# Patient Record
Sex: FEMALE | Race: BLACK OR AFRICAN AMERICAN | URBAN - METROPOLITAN AREA
[De-identification: names, ages, dates, MRNs, and addresses within clinical notes are randomized per-mention and may not be internally consistent; named-entity substitution may affect disease eponyms.]

---

## 2021-07-09 ENCOUNTER — HISTORICAL (OUTPATIENT)
Dept: ENDOSCOPY | Facility: HOSPITAL | Age: 66
End: 2021-07-09

## 2022-04-30 NOTE — OP NOTE
Patient:   Juli Bianchi            MRN: [id number]            FIN: [id number]               Age:   66 years     Sex:  Female     :  1955   Associated Diagnoses:   None   Author:   Elie Fatima MD      Operative Note   Operative Information   Date/ Time:  2021 08:49:00.     Procedures Performed: Colonoscopy, screening.     Preoperative Diagnosis: Screening for colon cancer.     Postoperative Diagnosis: 1.  Colonic diverticula, left and transverse colon.  2.  Internal hemorrhoids, grade 2.  Otherwise normal exam.     Surgeon: Elie Fatima MD.     Assistant: GI staff.     Anesthesia: per anaesthesia service.     Speciman Removed: None.     Esimated blood loss: No blood loss.     Description of Procedure/Findings/    Complications: History and physical as per pre-operative note. Informed consent was obtained. Patient was placed in left lateral position. Sedation per anaesthesia service. Rectal exam was unremarkable. Olympus video colonoscope was introduced into the rectum and was carefully advanced to the cecum. The quality of the preparation was satisfactory. Patient tolerated the procedure well without any complications..     Findings: There were multiple diverticula noted in left colon.  Scattered diverticula noted in transverse colon.  Remainder of the colon including cecum, ascending colon and rectum appeared unremarkable except grade 2 internal hemorrhoids noted on retroflexion in the rectum.  Terminal ileum was intubated during procedure and appeared to be unremarkable.  Estimated withdrawal time from cecum to rectum was 10 minutes and 59 seconds..     Complications: None.     Recommendations:  1.  High-fiber diet.    2.  Repeat colonoscopy in 10 years unless patient becomes symptomatic..

## 2022-04-30 NOTE — OP NOTE
Patient:   Juli Bianchi            MRN: [id number]            FIN: [id number]               Age:   66 years     Sex:  Female     :  1955   Associated Diagnoses:   None   Author:   Elie Fatima MD      Operative Note   Operative Information   Date/ Time:  2021 08:46:00.     Procedures Performed: EGD with biopsy.     Preoperative Diagnosis: H/O GERD.     Postoperative Diagnosis: 1.  Hiatal hernia, 3 or more centimeter with wide open Schatzki's ring  2.  Antral gastritis, mild to moderate.  Biopsies were obtained.  Otherwise normal exam.     Surgeon: Elie Fatima MD.     Assistant: GI staff.     Anesthesia: per anaesthesia service.     Speciman Removed: Yes.     Esimated blood loss: loss  3  cc.     Description of Procedure/Findings/    Complications: History and physical as per pre-operative note. Informed consent was obtained. Patient was placed in left lateral position. Sedation per anaesthesia service. Olympus video gastroscope was introduced into the oral cavity and esophagus was intubated under direct visualization. The scope was carefully advanced to the distal duodenum. The patient tolerated the procedure well without any complications. .     Findings: Esophagus: GE junction at approximately 33 cm.  There was a 3 cm or more hiatal hernia.  There was a wide open Schatzki's ring which was not dilated during the procedure.  Esophageal mucosa appeared unremarkable.  Stomach: Fundus, cardia and body appeared unremarkable.  There was mild to moderate erythema noted in antrum.  Antral biopsies were obtained.  No evidence of ulceration.  Duodenum: Duodenal bulb, 2nd and 3rd portion of the duodenum appeared unremarkable to the extent of exam..     Complications: None.     Recommendations:  1.  Follow biopsy results.  2.  Antireflux measures.  H2 blockers or PPI as needed.  3.  Colonoscopy to follow.

## 2025-03-06 DIAGNOSIS — M25.562 LEFT KNEE PAIN: ICD-10-CM

## 2025-03-06 DIAGNOSIS — S83.207A TEAR OF LEFT MENISCUS AS CURRENT INJURY, INITIAL ENCOUNTER: ICD-10-CM

## 2025-03-06 DIAGNOSIS — M25.762 OSTEOPHYTE OF LEFT KNEE: Primary | ICD-10-CM

## 2025-03-06 DIAGNOSIS — M17.12 OSTEOARTHRITIS OF LEFT KNEE: ICD-10-CM

## 2025-03-25 ENCOUNTER — HOSPITAL ENCOUNTER (OUTPATIENT)
Dept: RADIOLOGY | Facility: CLINIC | Age: 70
Discharge: HOME | End: 2025-03-25
Attending: ORTHOPAEDIC SURGERY
Payer: MEDICARE

## 2025-03-25 ENCOUNTER — OFFICE VISIT (OUTPATIENT)
Dept: ORTHOPEDICS | Facility: CLINIC | Age: 70
End: 2025-03-25
Payer: MEDICARE

## 2025-03-25 VITALS
HEIGHT: 64 IN | WEIGHT: 186 LBS | SYSTOLIC BLOOD PRESSURE: 115 MMHG | BODY MASS INDEX: 31.76 KG/M2 | DIASTOLIC BLOOD PRESSURE: 78 MMHG | HEART RATE: 78 BPM

## 2025-03-25 DIAGNOSIS — M17.12 PRIMARY OSTEOARTHRITIS OF LEFT KNEE: Primary | ICD-10-CM

## 2025-03-25 DIAGNOSIS — M23.307 DEGENERATIVE TEAR OF MENISCUS, LEFT: ICD-10-CM

## 2025-03-25 DIAGNOSIS — M17.12 PRIMARY OSTEOARTHRITIS OF LEFT KNEE: ICD-10-CM

## 2025-03-25 PROCEDURE — 1159F MED LIST DOCD IN RCRD: CPT | Mod: CPTII,,, | Performed by: ORTHOPAEDIC SURGERY

## 2025-03-25 PROCEDURE — 1100F PTFALLS ASSESS-DOCD GE2>/YR: CPT | Mod: CPTII,,, | Performed by: ORTHOPAEDIC SURGERY

## 2025-03-25 PROCEDURE — 1125F AMNT PAIN NOTED PAIN PRSNT: CPT | Mod: CPTII,,, | Performed by: ORTHOPAEDIC SURGERY

## 2025-03-25 PROCEDURE — 3078F DIAST BP <80 MM HG: CPT | Mod: CPTII,,, | Performed by: ORTHOPAEDIC SURGERY

## 2025-03-25 PROCEDURE — 1160F RVW MEDS BY RX/DR IN RCRD: CPT | Mod: CPTII,,, | Performed by: ORTHOPAEDIC SURGERY

## 2025-03-25 PROCEDURE — 20610 DRAIN/INJ JOINT/BURSA W/O US: CPT | Mod: LT,,, | Performed by: ORTHOPAEDIC SURGERY

## 2025-03-25 PROCEDURE — 3288F FALL RISK ASSESSMENT DOCD: CPT | Mod: CPTII,,, | Performed by: ORTHOPAEDIC SURGERY

## 2025-03-25 PROCEDURE — 73564 X-RAY EXAM KNEE 4 OR MORE: CPT | Mod: LT,,, | Performed by: ORTHOPAEDIC SURGERY

## 2025-03-25 PROCEDURE — 99204 OFFICE O/P NEW MOD 45 MIN: CPT | Mod: 25,,, | Performed by: ORTHOPAEDIC SURGERY

## 2025-03-25 PROCEDURE — 3008F BODY MASS INDEX DOCD: CPT | Mod: CPTII,,, | Performed by: ORTHOPAEDIC SURGERY

## 2025-03-25 PROCEDURE — 3074F SYST BP LT 130 MM HG: CPT | Mod: CPTII,,, | Performed by: ORTHOPAEDIC SURGERY

## 2025-03-25 RX ORDER — BETAMETHASONE SODIUM PHOSPHATE AND BETAMETHASONE ACETATE 3; 3 MG/ML; MG/ML
6 INJECTION, SUSPENSION INTRA-ARTICULAR; INTRALESIONAL; INTRAMUSCULAR; SOFT TISSUE
Status: DISCONTINUED | OUTPATIENT
Start: 2025-03-25 | End: 2025-03-25 | Stop reason: HOSPADM

## 2025-03-25 RX ORDER — MELOXICAM 7.5 MG/1
1 TABLET ORAL DAILY
COMMUNITY

## 2025-03-25 RX ORDER — LIDOCAINE HYDROCHLORIDE 20 MG/ML
2 INJECTION, SOLUTION INFILTRATION; PERINEURAL
Status: DISCONTINUED | OUTPATIENT
Start: 2025-03-25 | End: 2025-03-25 | Stop reason: HOSPADM

## 2025-03-25 RX ORDER — LEVOCETIRIZINE DIHYDROCHLORIDE 5 MG/1
1 TABLET, FILM COATED ORAL EVERY EVENING
COMMUNITY

## 2025-03-25 RX ORDER — MULTIVIT-MIN/FA/LYCOPEN/LUTEIN .4-300-25
TABLET ORAL
COMMUNITY

## 2025-03-25 RX ORDER — LOSARTAN POTASSIUM AND HYDROCHLOROTHIAZIDE 25; 100 MG/1; MG/1
1 TABLET ORAL DAILY
COMMUNITY

## 2025-03-25 RX ORDER — EZETIMIBE 10 MG/1
1 TABLET ORAL DAILY
COMMUNITY

## 2025-03-25 RX ORDER — ASPIRIN 81 MG/1
81 TABLET ORAL DAILY
COMMUNITY

## 2025-03-25 RX ORDER — TIRZEPATIDE 5 MG/.5ML
INJECTION, SOLUTION SUBCUTANEOUS
COMMUNITY

## 2025-03-25 RX ORDER — FAMOTIDINE 40 MG/1
TABLET, FILM COATED ORAL
COMMUNITY

## 2025-03-25 RX ORDER — LEVOTHYROXINE SODIUM 50 UG/1
TABLET ORAL
COMMUNITY

## 2025-03-25 RX ORDER — NIACIN 500 MG/1
TABLET, EXTENDED RELEASE ORAL
COMMUNITY

## 2025-03-25 RX ORDER — DULAGLUTIDE 1.5 MG/.5ML
INJECTION, SOLUTION SUBCUTANEOUS
COMMUNITY

## 2025-03-25 RX ORDER — ROSUVASTATIN CALCIUM 40 MG/1
TABLET, COATED ORAL
COMMUNITY

## 2025-03-25 RX ADMIN — BETAMETHASONE SODIUM PHOSPHATE AND BETAMETHASONE ACETATE 6 MG: 3; 3 INJECTION, SUSPENSION INTRA-ARTICULAR; INTRALESIONAL; INTRAMUSCULAR; SOFT TISSUE at 08:03

## 2025-03-25 RX ADMIN — LIDOCAINE HYDROCHLORIDE 2 MG: 20 INJECTION, SOLUTION INFILTRATION; PERINEURAL at 08:03

## 2025-03-25 NOTE — PROCEDURES
Large Joint Aspiration/Injection: L knee    Date/Time: 3/25/2025 8:15 AM    Performed by: Starla Limon FNP  Authorized by: Esa Gay MD    Consent Done?:  Yes (Verbal)  Indications:  Pain  Timeout: prior to procedure the correct patient, procedure, and site was verified    Prep: patient was prepped and draped in usual sterile fashion      Details:  Needle Size:  25 G  Approach:  Anterolateral  Location:  Knee  Site:  L knee  Medications:  2 mg LIDOcaine HCL 20 mg/ml (2%) 20 mg/mL (2 %); 6 mg betamethasone acetate-betamethasone sodium phosphate 6 mg/mL  Patient tolerance:  Patient tolerated the procedure well with no immediate complications

## 2025-03-25 NOTE — PROGRESS NOTES
Subjective:      Patient ID: Juli Bianchi is a 70 y.o. female.    Chief Complaint: Pain of the Left Knee (Left knee Pain possible meniscus tear (MRI Left Knee) patient states she just was out and started having pain the next maybe took the wrong just not sure started around November. She has been taking Tylenol to help with pain and wear a knee brace with relief she did have some swelling at first. )    HPI:     History of Present Illness    CHIEF COMPLAINT:  - Left knee pain    HPI:  Juli presents to the clinic for evaluation of left knee pain that has been bothering her since November. She denies any associated injuries or specific incidents that may have caused the pain. Pain primarily bothers her when she's moving around a lot or makes an awkward move. She manages symptoms with OTC pain medication. She also wears a compression sleeve, particularly when at work ([employer]). Sometimes she takes medication when she notices the pain, but other times she will sit down and relax for a moment. Juli received a steroid injection from Dr. Peoples approximately four or five years ago, which provided relief until recently when the pain returned. An MRI was obtained by Dr. Schulz prior to this visit, revealing degenerative changes in the knee, including arthritis and degenerative tears of the meniscus. She denies any associated injuries to the left knee or any symptoms or pain in the right knee.    PREVIOUS TREATMENTS:  - Steroid injection: 4-5 years ago, provided significant benefit until recently when the pain returned    IMAGING:  - X-ray of the left knee: , shows narrowed joint spaces, indicating less cartilage and an arthritic process  - MRI of the left knee: , shows degenerative tears of the meniscus and overall degenerative process in the knee    MEDICATIONS:  - Tylenol or Advil or Aleve: as needed for knee pain, providing some benefit    WORK STATUS:  - Works at [employer]  - Wears a compression sleeve while working  -  "Knee bothers patient when moving around a lot or making awkward moves, potentially affecting mobility at work      ROS:  Musculoskeletal: +joint pain, +limb pain, +pain with movement          History reviewed. No pertinent past medical history.  History reviewed. No pertinent surgical history.  Social History[1]    Current Medications[2]  Review of patient's allergies indicates:  No Known Allergies    /78 (BP Location: Left arm, Patient Position: Sitting)   Pulse 78   Ht 5' 4" (1.626 m)   Wt 84.4 kg (186 lb)   BMI 31.93 kg/m²     Comprehensive review of systems completed and negative except as per HPI.        Objective:   General: Well-developed, well-nourished.  Neuro: Alert and oriented x 3.  Psych: Normal mood and affect.  Card: Regular rate and rhythm  Resp: Respirations regular and unlabored    Left Knee Exam:  Varus deformity. Range of motion from 5-110 degrees. Negative patella grind and equal subluxation of knee cap medial and lateral < 1cm. Negative patella tendon tenderness. Negative Lachman and anterior drawer test. Negative posterior drawer test. Negative varus and valgus stress test. Positive medial joint line tenderness. Negative lateral joint line tenderness. 4/5 strength and normal skin appearance. Sensibility normal.        Assessment:         1. Primary osteoarthritis of left knee    2. Degenerative tear of meniscus, left          Plan:       Orders Placed This Encounter    Large Joint Aspiration/Injection: L knee    X-Ray Knee Complete 4 or More Views Left        Imaging and exam findings discussed.     Assessment & Plan    PROCEDURES:  - # Procedures  - Corticosteroid injection administered today.  - Injection typically lasts 6-8 weeks on average, but can vary from no effect to up to 1-2 years of relief.    MEDICATIONS:  - Continue taking Tylenol as needed for pain.    FOLLOW UP:  - Follow up in 3-4 months.    PATIENT INSTRUCTIONS:  - Resume normal activities, including [employer], " starting tomorrow.  - Use compression sleeve as needed.         Large Joint Aspiration/Injection: L knee    Date/Time: 3/25/2025 8:15 AM    Performed by: Starla Limon FNP  Authorized by: Esa Gay MD    Consent Done?:  Yes (Verbal)  Indications:  Pain  Timeout: prior to procedure the correct patient, procedure, and site was verified    Prep: patient was prepped and draped in usual sterile fashion      Details:  Needle Size:  25 G  Approach:  Anterolateral  Location:  Knee  Site:  L knee  Medications:  2 mg LIDOcaine HCL 20 mg/ml (2%) 20 mg/mL (2 %); 6 mg betamethasone acetate-betamethasone sodium phosphate 6 mg/mL  Patient tolerance:  Patient tolerated the procedure well with no immediate complications       All questions were answered. Patient happy and in agreement with the plan.     This note was generated with the assistance of ambient listening technology. Verbal consent was obtained by the patient and accompanying visitor(s) for the recording of patient appointment to facilitate this note. I attest to having reviewed and edited the generated note for accuracy, though some syntax or spelling errors may persist. Please contact the author of this note for any clarification.         [1]   Social History  Socioeconomic History    Marital status:    [2]   Current Outpatient Medications:     aspirin (ECOTRIN) 81 MG EC tablet, Take 81 mg by mouth once daily., Disp: , Rfl:     dulaglutide (TRULICITY) 1.5 mg/0.5 mL pen injector, INJECT 1.5MG (1 PEN) UNDER THE SKIN (SUBCUTANEOUSLY) ONCE A WEEK, Disp: , Rfl:     ezetimibe (ZETIA) 10 mg tablet, Take 1 tablet by mouth once daily., Disp: , Rfl:     famotidine (PEPCID) 40 MG tablet, TAKE 1 TABLET BY MOUTH AT BEDTIME ORALLY ONCE A DAY, Disp: , Rfl:     levocetirizine (XYZAL) 5 MG tablet, Take 1 tablet by mouth once daily., Disp: , Rfl:     levothyroxine (SYNTHROID) 50 MCG tablet, TAKE 1 TABLET BY MOUTH EVERY DAY IN THE MORNING ON EMPTY STOMACH, Disp: , Rfl:      losartan-hydrochlorothiazide 100-25 mg (HYZAAR) 100-25 mg per tablet, Take 1 tablet by mouth once daily., Disp: , Rfl:     meloxicam (MOBIC) 7.5 MG tablet, Take 1 tablet by mouth once daily., Disp: , Rfl:     MOUNJARO 5 mg/0.5 mL PnIj, , Disp: , Rfl:     multivit-min-FA-lycopen-lutein (CENTRUM SILVER) 0.4 mg-300 mcg- 250 mcg Tab, TAKE 1 TABLET ONE TIME DAILY AS DIRECTED once per day, Disp: , Rfl:     niacin (SLO-NIACIN) 500 mg tablet, TAKE 1 TABLET BY MOUTH EVERY DAY WITH FOOD ORALLY ONCE A DAY 90 DAY(S), Disp: , Rfl:     rosuvastatin (CRESTOR) 40 MG Tab, TAKE 1 TABLET BY MOUTH EVERY DAY ORALLY ONCE A DAY, Disp: , Rfl:

## 2025-07-29 ENCOUNTER — OFFICE VISIT (OUTPATIENT)
Dept: ORTHOPEDICS | Facility: CLINIC | Age: 70
End: 2025-07-29
Payer: MEDICARE

## 2025-07-29 VITALS
WEIGHT: 177.19 LBS | HEART RATE: 76 BPM | HEIGHT: 64 IN | BODY MASS INDEX: 30.25 KG/M2 | SYSTOLIC BLOOD PRESSURE: 130 MMHG | DIASTOLIC BLOOD PRESSURE: 77 MMHG

## 2025-07-29 DIAGNOSIS — M17.12 PRIMARY OSTEOARTHRITIS OF LEFT KNEE: Primary | ICD-10-CM

## 2025-07-29 PROCEDURE — 3075F SYST BP GE 130 - 139MM HG: CPT | Mod: CPTII,,, | Performed by: ORTHOPAEDIC SURGERY

## 2025-07-29 PROCEDURE — 3008F BODY MASS INDEX DOCD: CPT | Mod: CPTII,,, | Performed by: ORTHOPAEDIC SURGERY

## 2025-07-29 PROCEDURE — 3078F DIAST BP <80 MM HG: CPT | Mod: CPTII,,, | Performed by: ORTHOPAEDIC SURGERY

## 2025-07-29 PROCEDURE — 99213 OFFICE O/P EST LOW 20 MIN: CPT | Mod: ,,, | Performed by: ORTHOPAEDIC SURGERY

## 2025-07-29 PROCEDURE — 1159F MED LIST DOCD IN RCRD: CPT | Mod: CPTII,,, | Performed by: ORTHOPAEDIC SURGERY

## 2025-07-29 NOTE — PROGRESS NOTES
"Subjective:      Patient ID: Juli Bianchi is a 70 y.o. female.    Chief Complaint: Follow-up (F/u Left Knee injection on 3/25/25 patient states it worked well. She has pain only when she makes a wrong movement. She is wearing a knee sleeve on today.)    HPI:     History of Present Illness    CHIEF COMPLAINT:  - Knee procedure follow-up and buttock injury from recent fall.    HPI:  Juli presents for follow-up regarding her knee. She reports wearing her knee brace daily. Her knee symptoms are primarily triggered by awkward movements, but she denies any serious problems otherwise. She has been doing well overall with her knee. She also mentions a fall on her buttocks approximately two weeks ago.    She denies any medical diagnoses.    SURGICAL HISTORY:  - Complete ureterine junction: 03/25/2025      ROS:  Musculoskeletal: +pain with movement          History reviewed. No pertinent past medical history.  History reviewed. No pertinent surgical history.  Social History[1]    Current Medications[2]  Review of patient's allergies indicates:  No Known Allergies    /77 (BP Location: Right arm, Patient Position: Sitting)   Pulse 76   Ht 5' 4" (1.626 m)   Wt 80.4 kg (177 lb 3.2 oz)   BMI 30.42 kg/m²     Comprehensive review of systems completed and negative except as per HPI.        Objective:   General: Well-developed, well-nourished.  Neuro: Alert and oriented x 3.  Psych: Normal mood and affect.  Card: Regular rate and rhythm  Resp: Respirations regular and unlabored    Left Knee Exam:  Varus deformity. Range of motion from 5-110 degrees. Negative patella grind and equal subluxation of knee cap medial and lateral < 1cm. Negative patella tendon tenderness. Negative Lachman and anterior drawer test. Negative posterior drawer test. Negative varus and valgus stress test. Positive medial joint line tenderness. Negative lateral joint line tenderness. 4/5 strength and normal skin appearance. Sensibility " normal.      Assessment:         1. Primary osteoarthritis of left knee          Plan:             Imaging and exam findings discussed.     Assessment & Plan    PROCEDURES:  - # Procedures  - Discussed not recommending an injection until the patient starts having pain again.    FOLLOW UP:  - Follow up in 3-4 months or sooner if knee pain worsens.  - Contact the office when knee starts to bother patient again.               All questions were answered. Patient happy and in agreement with the plan.     This note was generated with the assistance of ambient listening technology. Verbal consent was obtained by the patient and accompanying visitor(s) for the recording of patient appointment to facilitate this note. I attest to having reviewed and edited the generated note for accuracy, though some syntax or spelling errors may persist. Please contact the author of this note for any clarification.         [1]   Social History  Socioeconomic History    Marital status:    [2]   Current Outpatient Medications:     aspirin (ECOTRIN) 81 MG EC tablet, Take 81 mg by mouth once daily., Disp: , Rfl:     ezetimibe (ZETIA) 10 mg tablet, Take 1 tablet by mouth once daily., Disp: , Rfl:     famotidine (PEPCID) 40 MG tablet, TAKE 1 TABLET BY MOUTH AT BEDTIME ORALLY ONCE A DAY, Disp: , Rfl:     levocetirizine (XYZAL) 5 MG tablet, Take 1 tablet by mouth once daily., Disp: , Rfl:     levothyroxine (SYNTHROID) 50 MCG tablet, TAKE 1 TABLET BY MOUTH EVERY DAY IN THE MORNING ON EMPTY STOMACH, Disp: , Rfl:     losartan-hydrochlorothiazide 100-25 mg (HYZAAR) 100-25 mg per tablet, Take 1 tablet by mouth once daily., Disp: , Rfl:     meloxicam (MOBIC) 7.5 MG tablet, Take 1 tablet by mouth once daily., Disp: , Rfl:     MOUNJARO 5 mg/0.5 mL PnIj, , Disp: , Rfl:     multivit-min-FA-lycopen-lutein (CENTRUM SILVER) 0.4 mg-300 mcg- 250 mcg Tab, TAKE 1 TABLET ONE TIME DAILY AS DIRECTED once per day, Disp: , Rfl:     niacin (SLO-NIACIN) 500 mg  tablet, TAKE 1 TABLET BY MOUTH EVERY DAY WITH FOOD ORALLY ONCE A DAY 90 DAY(S), Disp: , Rfl:     rosuvastatin (CRESTOR) 40 MG Tab, TAKE 1 TABLET BY MOUTH EVERY DAY ORALLY ONCE A DAY, Disp: , Rfl:     dulaglutide (TRULICITY) 1.5 mg/0.5 mL pen injector, INJECT 1.5MG (1 PEN) UNDER THE SKIN (SUBCUTANEOUSLY) ONCE A WEEK, Disp: , Rfl: